# Patient Record
Sex: MALE | URBAN - METROPOLITAN AREA
[De-identification: names, ages, dates, MRNs, and addresses within clinical notes are randomized per-mention and may not be internally consistent; named-entity substitution may affect disease eponyms.]

---

## 2018-10-14 ENCOUNTER — EMERGENCY (EMERGENCY)
Facility: HOSPITAL | Age: 43
LOS: 1 days | Discharge: HOME | End: 2018-10-14
Attending: EMERGENCY MEDICINE
Payer: SELF-PAY

## 2018-10-14 VITALS
OXYGEN SATURATION: 100 % | RESPIRATION RATE: 17 BRPM | SYSTOLIC BLOOD PRESSURE: 139 MMHG | DIASTOLIC BLOOD PRESSURE: 74 MMHG | HEART RATE: 74 BPM | TEMPERATURE: 99 F

## 2018-10-14 VITALS
DIASTOLIC BLOOD PRESSURE: 79 MMHG | SYSTOLIC BLOOD PRESSURE: 122 MMHG | OXYGEN SATURATION: 100 % | HEART RATE: 84 BPM | TEMPERATURE: 98 F | RESPIRATION RATE: 16 BRPM

## 2018-10-14 LAB
APPEARANCE UR: CLEAR — SIGNIFICANT CHANGE UP
BILIRUB UR-MCNC: NEGATIVE — SIGNIFICANT CHANGE UP
COLOR SPEC: YELLOW — SIGNIFICANT CHANGE UP
DIFF PNL FLD: NEGATIVE — SIGNIFICANT CHANGE UP
GLUCOSE UR QL: NEGATIVE — SIGNIFICANT CHANGE UP
KETONES UR-MCNC: NEGATIVE — SIGNIFICANT CHANGE UP
LEUKOCYTE ESTERASE UR-ACNC: NEGATIVE — SIGNIFICANT CHANGE UP
NITRITE UR-MCNC: NEGATIVE — SIGNIFICANT CHANGE UP
PH UR: 6 — SIGNIFICANT CHANGE UP (ref 5–8)
PROT UR-MCNC: NEGATIVE — SIGNIFICANT CHANGE UP
SP GR SPEC: 1.02 — SIGNIFICANT CHANGE UP (ref 1.01–1.02)
UROBILINOGEN FLD QL: NEGATIVE — SIGNIFICANT CHANGE UP

## 2018-10-14 PROCEDURE — 99284 EMERGENCY DEPT VISIT MOD MDM: CPT | Mod: 25

## 2018-10-14 PROCEDURE — 81003 URINALYSIS AUTO W/O SCOPE: CPT

## 2018-10-14 PROCEDURE — 99284 EMERGENCY DEPT VISIT MOD MDM: CPT

## 2018-10-14 PROCEDURE — 87086 URINE CULTURE/COLONY COUNT: CPT

## 2018-10-14 PROCEDURE — 76870 US EXAM SCROTUM: CPT

## 2018-10-14 PROCEDURE — 76870 US EXAM SCROTUM: CPT | Mod: 26

## 2018-10-14 RX ORDER — IBUPROFEN 200 MG
1 TABLET ORAL
Qty: 15 | Refills: 0 | OUTPATIENT
Start: 2018-10-14 | End: 2018-10-18

## 2018-10-14 RX ORDER — OXYCODONE AND ACETAMINOPHEN 5; 325 MG/1; MG/1
1 TABLET ORAL EVERY 4 HOURS
Qty: 0 | Refills: 0 | Status: DISCONTINUED | OUTPATIENT
Start: 2018-10-14 | End: 2018-10-14

## 2018-10-14 RX ADMIN — OXYCODONE AND ACETAMINOPHEN 1 TABLET(S): 5; 325 TABLET ORAL at 18:37

## 2018-10-14 RX ADMIN — OXYCODONE AND ACETAMINOPHEN 1 TABLET(S): 5; 325 TABLET ORAL at 15:37

## 2018-10-14 RX ADMIN — Medication 100 MILLIGRAM(S): at 17:37

## 2018-10-14 NOTE — ED STATDOCS - OBJECTIVE STATEMENT
Telemedicine assessment was conducted (using real time 2 way audio-video technology) by Dr. VIRGILIO Rinaldi located at [address] [place] [address]  ++++++++++++++++++++++++  Pertinent patient history and initial plan: Patient c/o left testicle pain and swelling. Patient states he is having difficulty ambulating. Patient denies foul smelling urine or any penile discharge. Patient states his pain has exacerbated since this morning. Patient says he has always had a problem with his testicles. Patient denies burning urination. Telemedicine assessment was conducted (using real time 2 way audio-video technology) by Dr. VIRGILIO Rinaldi located at [address] [place] [id number]  ++++++++++++++++++++++++  Pertinent patient history and initial plan: Patient c/o left testicle pain and swelling. Patient states he is having difficulty ambulating. Patient denies foul smelling urine or any penile discharge. Patient states his pain has exacerbated since this morning. Patient says he has always had a problem with his testicles. Patient denies burning urination. [place] and [place] has found the problem. No bulging. Patient seen by me in intake for initial assessment and ordering. Physician on site to follow results and further evaluate and treat patient.

## 2018-10-14 NOTE — ED ADULT NURSE NOTE - OBJECTIVE STATEMENT
pt from home c/o of Lt testicular pain with swelling since yesterday pt is alert awake oriented x3 denies any fever at home denies any penile discharges pt reports "worsening pain with ambulation" ambulatory with steady gait

## 2018-10-14 NOTE — ED PROVIDER NOTE - OBJECTIVE STATEMENT
42 yo male with testicular pain since this morning. Patient states he has had recurrent "problems" with his testicles, denies foul odor or discharge, Denies multiple partners, or trauma to scrotal area. Patient has no other complaints at this time.

## 2018-10-15 LAB
CULTURE RESULTS: NO GROWTH — SIGNIFICANT CHANGE UP
SPECIMEN SOURCE: SIGNIFICANT CHANGE UP
